# Patient Record
Sex: MALE | Race: WHITE | NOT HISPANIC OR LATINO | URBAN - METROPOLITAN AREA
[De-identification: names, ages, dates, MRNs, and addresses within clinical notes are randomized per-mention and may not be internally consistent; named-entity substitution may affect disease eponyms.]

---

## 2023-02-21 LAB — SARS-COV-2 RESULT: NOT DETECTED

## 2023-06-29 ENCOUNTER — HOSPITAL ENCOUNTER (OUTPATIENT)
Dept: DATA CONVERSION | Facility: HOSPITAL | Age: 88
End: 2023-06-29
Attending: OTOLARYNGOLOGY | Admitting: OTOLARYNGOLOGY

## 2023-06-29 DIAGNOSIS — Z79.84 LONG TERM (CURRENT) USE OF ORAL HYPOGLYCEMIC DRUGS: ICD-10-CM

## 2023-06-29 DIAGNOSIS — Z85.89 PERSONAL HISTORY OF MALIGNANT NEOPLASM OF OTHER ORGANS AND SYSTEMS: ICD-10-CM

## 2023-06-29 DIAGNOSIS — E11.9 TYPE 2 DIABETES MELLITUS WITHOUT COMPLICATIONS (MULTI): ICD-10-CM

## 2023-06-29 DIAGNOSIS — I10 ESSENTIAL (PRIMARY) HYPERTENSION: ICD-10-CM

## 2023-06-29 DIAGNOSIS — H02.203 UNSPECIFIED LAGOPHTHALMOS RIGHT EYE, UNSPECIFIED EYELID: ICD-10-CM

## 2023-06-29 DIAGNOSIS — K11.8 OTHER DISEASES OF SALIVARY GLANDS: ICD-10-CM

## 2023-06-29 DIAGNOSIS — G51.0 BELL'S PALSY: ICD-10-CM

## 2023-06-29 DIAGNOSIS — Z85.46 PERSONAL HISTORY OF MALIGNANT NEOPLASM OF PROSTATE: ICD-10-CM

## 2023-06-29 DIAGNOSIS — H02.103 UNSPECIFIED ECTROPION OF RIGHT EYE, UNSPECIFIED EYELID: ICD-10-CM

## 2023-06-29 DIAGNOSIS — Z88.0 ALLERGY STATUS TO PENICILLIN: ICD-10-CM

## 2023-06-29 DIAGNOSIS — H02.532 EYELID RETRACTION RIGHT LOWER EYELID: ICD-10-CM

## 2023-06-29 DIAGNOSIS — Z85.828 PERSONAL HISTORY OF OTHER MALIGNANT NEOPLASM OF SKIN: ICD-10-CM

## 2023-06-29 DIAGNOSIS — E78.5 HYPERLIPIDEMIA, UNSPECIFIED: ICD-10-CM

## 2023-06-29 DIAGNOSIS — C7A.8 OTHER MALIGNANT NEUROENDOCRINE TUMORS (MULTI): ICD-10-CM

## 2023-06-29 LAB — POCT GLUCOSE: 144 MG/DL (ref 74–99)

## 2023-09-30 NOTE — H&P
History of Present Illness:   History Present Illness:  Reason for surgery: facial paralysis   HPI:    HPI: No significant changes to the medical history since last H/P    PMH: reviewed in chart   Fam Hx: negative for bleeding disorders  Social Hx: Reviewed in EMR  Allergies: NKDA  ROS: negative except as above in HPI  Physical Exam:  Gen- NAD  Resp- nonlabored on RA, symmetric chest rise  Head/Face- NCAT, no masses or lesions  Eyes- EOMI, clear sclera  Ears- normal external ears, no gross lesions of EACs  Nose- anterior nares clear, no bleeding or drainage  Mouth- lips without lesions, no excessive drooling  Neuro- alert and interactive    Medications, imaging and pertinent labs reviewed in EMR    A/P  Proceed with planned surgery.    Allergies:        Allergies:  ·  penicillin : Unknown    Home Medication Review:   Home Medications Reviewed: yes     Impression/Procedure:   ·  Impression and Planned Procedure: facial paralysis  lateral tarsal strip, canthopexy       ERAS (Enhanced Recovery After Surgery):  ·  ERAS Patient: no       Physical Exam by System:    Respiratory/Thorax: Patent airways, normal breath  sounds with good chest expansion, thorax symmetric   Cardiovascular: Regular, rate and rhythm, 2+ equal  pulses of the extremities     Consent:   COVID-19 Consent:  ·  COVID-19 Risk Consent Surgeon has reviewed key risks related to the risk of irvin COVID-19 and if they contract COVID-19 what the risks are.     Attestation:   Note Completion:  I am a:  Resident/Fellow   Attending Attestation I saw and evaluated the patient.  I personally obtained the key and critical portions of the history and physical exam or was physically present for key and  critical portions performed by the resident/fellow. I reviewed the resident/fellow?s documentation and discussed the patient with the resident/fellow.  I agree with the resident/fellow?s medical decision making as documented in the note.     I personally  evaluated the patient on 29-Jun-2023         Electronic Signatures:  Pepe Higgins)  (Signed 29-Jun-2023 11:17)   Authored: Note Completion   Co-Signer: History of Present Illness, Allergies, Home Medication Review, Impression/Procedure, ERAS, Physical Exam, Consent, Note Completion  Dilip TinocoDO (Resident))  (Signed 28-Jun-2023 21:48)   Authored: History of Present Illness, Allergies, Home  Medication Review, Impression/Procedure, ERAS, Physical Exam, Consent, Note Completion      Last Updated: 29-Jun-2023 11:17 by Pepe Higgins)

## 2023-10-02 NOTE — OP NOTE
PROCEDURE DETAILS    Preoperative Diagnosis:  Ectropion, right  Right facial paralysis  Postoperative Diagnosis:  Ectropion, right  Right facial paralysis  Surgeon: Dr. Higgins  Resident/Fellow/Other Assistant: Dr. Tinoco    Procedure:  right lateral tarsal strip   right canthopexy   Anesthesia: general  Estimated Blood Loss: 20  Findings: Right lateral tarsal strip performed with canthopexy. Collagen implant placed in right lower lid.   Specimens(s) Collected: no,     Complications: none  Drains and/or Catheters: none  Patient Returned To/Condition: PACU in stable condition         Operative Report:   Patient presents with a history of right sided facial paralysis, lagopthalmos, oral competency concerns and lower lid retraction. The decision was made for  operative repair with an eyelid weight, spacer graft, and tarsal adhesion. The patient understood risk and alternatives and wished to proceed.    The patient was sedated for the procedure. 1% lidocaine with epinephrine was injected at all planned sites for incision.     Attention was turned to the right eyelid. A tarsal strip revision eyelid ectropion repair and canthoplasty was performed. This was in the setting  of previous tarsal strip performed that failed and dehisced. A canthotomy and cantholysis was performed. The tarsal strip was isolated with denuding of the lid margin, skin and conjunctiva. The complexity of this portion of the procedure was significantly  elevated in the setting of previous tarsal strip procedure and severe ectropion - requiring more time than routinely encountered. The tarsal strip was shortened. A wire passing drill bit on the core drill was utilized tot place two holes within the lateral  orbit bone. The tarsal strip was then suspended to the lateral orbit margin using 5-0 vicryl through the previously drilled holes and secured in a mattress fashion. A lateral incision was made along a crease and a skin, orbicularis flap was  elevated over  the lower eyelid for precise pocket and placement of a fashioned enduragen allograft material for soft tissue reinforcement of the lower eyelid. This incision was closed with interrupted fast gut suture. Finally an upper eyelid lateral lid adhesion was  designed with incisions at the lateral upper lid and inset into the lower eyelid along a prepared insertion site at the lateral lower eyelid gray line. This was secured with vicryl suture in an interrupted fashion. The skin was closed with fast gut suture.  Ointment was applied to all incisions.                        Attestation:   Note Completion:  Attending Attestation I was present for the entire procedure    I am a: Resident/Fellow         Electronic Signatures:  Pepe Higgins)  (Signed 30-Jun-2023 14:56)   Authored: Post-Operative Note, Chart Review, Note Completion   Co-Signer: Post-Operative Note, Chart Review, Note Completion  Dilip Tinoco (DO (Resident))  (Signed 29-Jun-2023 17:13)   Authored: Post-Operative Note, Chart Review, Note Completion      Last Updated: 30-Jun-2023 14:56 by Pepe Higgins)